# Patient Record
Sex: FEMALE | Race: WHITE
[De-identification: names, ages, dates, MRNs, and addresses within clinical notes are randomized per-mention and may not be internally consistent; named-entity substitution may affect disease eponyms.]

---

## 2018-10-30 PROBLEM — Z00.00 ENCOUNTER FOR PREVENTIVE HEALTH EXAMINATION: Status: ACTIVE | Noted: 2018-10-30

## 2018-11-08 ENCOUNTER — APPOINTMENT (OUTPATIENT)
Dept: HEMATOLOGY ONCOLOGY | Facility: CLINIC | Age: 58
End: 2018-11-08
Payer: COMMERCIAL

## 2018-11-08 DIAGNOSIS — N18.3 CHRONIC KIDNEY DISEASE, STAGE 3 (MODERATE): ICD-10-CM

## 2018-11-08 DIAGNOSIS — Z86.79 PERSONAL HISTORY OF OTHER DISEASES OF THE CIRCULATORY SYSTEM: ICD-10-CM

## 2018-11-08 DIAGNOSIS — Z87.891 PERSONAL HISTORY OF NICOTINE DEPENDENCE: ICD-10-CM

## 2018-11-08 DIAGNOSIS — Z80.1 FAMILY HISTORY OF MALIGNANT NEOPLASM OF TRACHEA, BRONCHUS AND LUNG: ICD-10-CM

## 2018-11-08 DIAGNOSIS — Z86.39 PERSONAL HISTORY OF OTHER ENDOCRINE, NUTRITIONAL AND METABOLIC DISEASE: ICD-10-CM

## 2018-11-08 PROCEDURE — 99205 OFFICE O/P NEW HI 60 MIN: CPT

## 2018-11-08 NOTE — ASSESSMENT
[FreeTextEntry1] : Given the very small size 0.5 cm with no mitoses seen, a low grade (G1), negative LVI, and negative margins she has likely had a curative surgery at this point.\par I would like to review the findings with pathology to get a better orientation of where the tumor was within the surgical specimen and to establish the extent of the margins.\par I will obtain a CBC and a CMP today's office visit.\par I will order abdominal and pelvic imaging.\par She will return in 2 weeks to review the above mentioned workup and to establish a followup care plan from that point moving forward. [Curative] : Goals of care discussed with patient: Curative

## 2018-11-08 NOTE — HISTORY OF PRESENT ILLNESS
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 Pathology from this revealed a 0.5 cm lesion, that was grade 1, with no mitoses being seen, no LVI, and surgical margins were clear. She is now been referred for further oncological evaluation and management recommendations. She offers no new complaints at the time of this office evaluation. She denies fevers, chills, lightheadedness, dizziness, headaches, chest pain, chest palpitations, shortness of breath, abdominal pain, signs of blood loss including melena, urinary symptoms, lower extremity swelling or pain, or rashes/ecchymoses/petechiae. 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

## 2018-11-20 ENCOUNTER — APPOINTMENT (OUTPATIENT)
Dept: HEMATOLOGY ONCOLOGY | Facility: CLINIC | Age: 58
End: 2018-11-20
Payer: COMMERCIAL

## 2018-11-20 VITALS
TEMPERATURE: 98.1 F | OXYGEN SATURATION: 95 % | BODY MASS INDEX: 32.56 KG/M2 | SYSTOLIC BLOOD PRESSURE: 148 MMHG | DIASTOLIC BLOOD PRESSURE: 86 MMHG | HEART RATE: 76 BPM | HEIGHT: 66.5 IN | WEIGHT: 205 LBS | RESPIRATION RATE: 18 BRPM

## 2018-11-20 PROCEDURE — 99214 OFFICE O/P EST MOD 30 MIN: CPT

## 2018-11-20 NOTE — HISTORY OF PRESENT ILLNESS
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 Pathology from this revealed a 0.5 cm lesion, that was grade 1, with no mitoses being seen, no LVI, and surgical margins were clear. She is now been referred for further oncological evaluation and management recommendations. She offers no new complaints at the time of this office evaluation. She denies fevers, chills, lightheadedness, dizziness, headaches, chest pain, chest palpitations, shortness of breath, abdominal pain, signs of blood loss including melena, urinary symptoms, lower extremity swelling or pain, or rashes/ecchymoses/petechiae. 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  She obtained a CT of the abdomen and pelvis. [0 - No Distress] : Distress Level: 0

## 2018-11-20 NOTE — ASSESSMENT
[FreeTextEntry1] : Given the very small size 0.5 cm with no mitoses seen, a low grade (G1), negative LVI, and negative margins she has likely had a curative surgery at this point.\par I will obtain a CBC and a CMP today's office visit.\par Abdominal and pelvic imaging showed KRISTY.\par She will return in 3 months for f/u with labs.  Given the favorable characteristics of her disease we agreed to re-image in 6 months.

## 2019-02-11 ENCOUNTER — APPOINTMENT (OUTPATIENT)
Dept: HEMATOLOGY ONCOLOGY | Facility: CLINIC | Age: 59
End: 2019-02-11
Payer: COMMERCIAL

## 2019-02-11 VITALS
BODY MASS INDEX: 30.34 KG/M2 | DIASTOLIC BLOOD PRESSURE: 94 MMHG | RESPIRATION RATE: 18 BRPM | HEIGHT: 66.5 IN | SYSTOLIC BLOOD PRESSURE: 152 MMHG | WEIGHT: 191 LBS | TEMPERATURE: 97.7 F | HEART RATE: 60 BPM | OXYGEN SATURATION: 97 %

## 2019-02-11 PROCEDURE — 99214 OFFICE O/P EST MOD 30 MIN: CPT

## 2019-02-11 NOTE — ASSESSMENT
[FreeTextEntry1] : Given the very small size 0.5 cm with no mitoses seen, a low grade (G1), negative LVI, and negative margins she has likely had a curative surgery at this point.\par I will obtain a CBC and a CMP today's office visit.\par Abdominal and pelvic imaging showed KRISTY in November of 2018.\par She will return in 3 months for f/u with labs.  Given the favorable characteristics of her disease we agreed to re-image in 6 months, which will be ordered at the next office visit.

## 2019-02-11 NOTE — HISTORY OF PRESENT ILLNESS
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 Pathology from this revealed a 0.5 cm lesion, that was grade 1, with no mitoses being seen, no LVI, and surgical margins were clear. She is now been referred for further oncological evaluation and management recommendations. She offers no new complaints at the time of this office evaluation. She denies fevers, chills, lightheadedness, dizziness, headaches, chest pain, chest palpitations, shortness of breath, abdominal pain, signs of blood loss including melena, urinary symptoms, lower extremity swelling or pain, or rashes/ecchymoses/petechiae. 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 She offers no complaints at this time.

## 2019-05-06 ENCOUNTER — APPOINTMENT (OUTPATIENT)
Dept: HEMATOLOGY ONCOLOGY | Facility: CLINIC | Age: 59
End: 2019-05-06
Payer: COMMERCIAL

## 2019-05-06 VITALS
SYSTOLIC BLOOD PRESSURE: 169 MMHG | HEIGHT: 66.5 IN | WEIGHT: 190 LBS | DIASTOLIC BLOOD PRESSURE: 84 MMHG | TEMPERATURE: 98.2 F | OXYGEN SATURATION: 98 % | HEART RATE: 58 BPM | BODY MASS INDEX: 30.17 KG/M2 | RESPIRATION RATE: 18 BRPM

## 2019-05-06 PROCEDURE — 99214 OFFICE O/P EST MOD 30 MIN: CPT

## 2019-05-06 NOTE — ASSESSMENT
[FreeTextEntry1] : Given the very small size 0.5 cm with no mitoses seen, a low grade (G1), negative LVI, and negative margins she has likely had a curative surgery at this point.\par I will obtain a CBC and a CMP today's office visit.\par Abdominal and pelvic imaging showed KRISTY in November of 2018.\par She will return in 3 weeks for f/u with labs.  Given the favorable characteristics of her disease we agreed to re-image 6 months apart, therefore, scans were ordered at this office visit.

## 2019-05-06 NOTE — HISTORY OF PRESENT ILLNESS
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 Pathology from this revealed a 0.5 cm lesion, that was grade 1, with no mitoses being seen, no LVI, and surgical margins were clear. She is now been referred for further oncological evaluation and management recommendations. She offers no new complaints at the time of this office evaluation. She denies fevers, chills, lightheadedness, dizziness, headaches, chest pain, chest palpitations, shortness of breath, abdominal pain, signs of blood loss including melena, urinary symptoms, lower extremity swelling or pain, or rashes/ecchymoses/petechiae. [de-identified] : Ms Shah is here today for a follow up visit. She offers no complaints at this time.  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

## 2019-05-28 ENCOUNTER — APPOINTMENT (OUTPATIENT)
Dept: HEMATOLOGY ONCOLOGY | Facility: CLINIC | Age: 59
End: 2019-05-28
Payer: COMMERCIAL

## 2019-05-28 VITALS
DIASTOLIC BLOOD PRESSURE: 96 MMHG | WEIGHT: 184 LBS | SYSTOLIC BLOOD PRESSURE: 175 MMHG | HEART RATE: 62 BPM | BODY MASS INDEX: 29.25 KG/M2 | TEMPERATURE: 97.8 F | RESPIRATION RATE: 18 BRPM | OXYGEN SATURATION: 97 %

## 2019-05-28 PROCEDURE — 99214 OFFICE O/P EST MOD 30 MIN: CPT

## 2019-05-28 NOTE — ASSESSMENT
[FreeTextEntry1] : Given the very small size 0.5 cm with no mitoses seen, a low grade (G1), negative LVI, and negative margins she has likely had a curative surgery at this point.\par I will obtain a CBC and a CMP today's office visit.\par Abdominal and pelvic imaging showed KRISTY in November of 2018.  Repeat imaging last week showed an enlarging cystic splenic lesion that was not reported on the prior scan.   This shows marginal growth since the last scan.  I will obtain an MRI of the abdomen to better assess this finding.  \par She will return in 1 week for f/u with scan review.

## 2019-05-28 NOTE — HISTORY OF PRESENT ILLNESS
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 Pathology from this revealed a 0.5 cm lesion, that was grade 1, with no mitoses being seen, no LVI, and surgical margins were clear. She is now been referred for further oncological evaluation and management recommendations. She offers no new complaints at the time of this office evaluation. She denies fevers, chills, lightheadedness, dizziness, headaches, chest pain, chest palpitations, shortness of breath, abdominal pain, signs of blood loss including melena, urinary symptoms, lower extremity swelling or pain, or rashes/ecchymoses/petechiae. 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 She offers no complaints at this time.

## 2019-05-31 ENCOUNTER — RX CHANGE (OUTPATIENT)
Age: 59
End: 2019-05-31

## 2019-06-10 ENCOUNTER — APPOINTMENT (OUTPATIENT)
Dept: HEMATOLOGY ONCOLOGY | Facility: CLINIC | Age: 59
End: 2019-06-10
Payer: COMMERCIAL

## 2019-06-10 VITALS
HEART RATE: 63 BPM | OXYGEN SATURATION: 97 % | BODY MASS INDEX: 28.9 KG/M2 | SYSTOLIC BLOOD PRESSURE: 164 MMHG | HEIGHT: 66.5 IN | TEMPERATURE: 97.8 F | RESPIRATION RATE: 18 BRPM | DIASTOLIC BLOOD PRESSURE: 90 MMHG | WEIGHT: 182 LBS

## 2019-06-10 PROCEDURE — 99214 OFFICE O/P EST MOD 30 MIN: CPT

## 2019-06-10 NOTE — HISTORY OF PRESENT ILLNESS
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 Pathology from this revealed a 0.5 cm lesion, that was grade 1, with no mitoses being seen, no LVI, and surgical margins were clear. She is now been referred for further oncological evaluation and management recommendations. She offers no new complaints at the time of this office evaluation. She denies fevers, chills, lightheadedness, dizziness, headaches, chest pain, chest palpitations, shortness of breath, abdominal pain, signs of blood loss including melena, urinary symptoms, lower extremity swelling or pain, or rashes/ecchymoses/petechiae. 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 She offers no complaints at this time.

## 2019-06-10 NOTE — ASSESSMENT
[FreeTextEntry1] : Given the very small size 0.5 cm with no mitoses seen, a low grade (G1), negative LVI, and negative margins she has likely had a curative surgery at this point.\par I will obtain a CBC and a CMP today's office visit.\par Abdominal and pelvic imaging showed KRISTY in November of 2018.  Repeat imaging showed an enlarging cystic splenic lesion that was not reported on the prior scan.   This shows marginal growth since the last scan.  \par I have obtained an MRI of the abdomen to better assess this and this appears to be a septated cystic structure without post contrast enhancement. 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  I have recommended repeat imaging in 3-4 months to assure stability.\par She will return in 3 months for f/u, labs, and rescans to be ordered then.

## 2019-09-13 ENCOUNTER — APPOINTMENT (OUTPATIENT)
Dept: HEMATOLOGY ONCOLOGY | Facility: CLINIC | Age: 59
End: 2019-09-13
Payer: COMMERCIAL

## 2019-09-13 VITALS
OXYGEN SATURATION: 100 % | BODY MASS INDEX: 29.6 KG/M2 | WEIGHT: 182 LBS | DIASTOLIC BLOOD PRESSURE: 76 MMHG | SYSTOLIC BLOOD PRESSURE: 140 MMHG | RESPIRATION RATE: 18 BRPM | HEART RATE: 46 BPM | TEMPERATURE: 97.8 F | HEIGHT: 65.75 IN

## 2019-09-13 PROCEDURE — 99214 OFFICE O/P EST MOD 30 MIN: CPT

## 2019-09-13 NOTE — ASSESSMENT
[FreeTextEntry1] : Given the very small size 0.5 cm with no mitoses seen, a low grade (G1), negative LVI, and negative margins she has likely had a curative surgery at this point.\par I will obtain a CBC and a CMP today's office visit.\par Abdominal and pelvic imaging showed KRISTY in November of 2018.  Repeat imaging showed an enlarging cystic splenic lesion that was not reported on the prior scan.   This shows marginal growth since the last scan.  \par I have obtained an MRI of the abdomen to better assess this and this appears to be a septated cystic structure without post contrast enhancement. 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  I have recommended repeat imaging in 3-4 months to assure stability.  This will be ordered today.\par She will return in 3 weeks for f/u, labs, and rescans review.

## 2019-09-13 NOTE — HISTORY OF PRESENT ILLNESS
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 Pathology from this revealed a 0.5 cm lesion, that was grade 1, with no mitoses being seen, no LVI, and surgical margins were clear. She is now been referred for further oncological evaluation and management recommendations. She offers no new complaints at the time of this office evaluation. She denies fevers, chills, lightheadedness, dizziness, headaches, chest pain, chest palpitations, shortness of breath, abdominal pain, signs of blood loss including melena, urinary symptoms, lower extremity swelling or pain, or rashes/ecchymoses/petechiae. 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 She offers no complaints at this time.

## 2019-09-23 ENCOUNTER — APPOINTMENT (OUTPATIENT)
Dept: HEMATOLOGY ONCOLOGY | Facility: CLINIC | Age: 59
End: 2019-09-23
Payer: COMMERCIAL

## 2019-09-23 VITALS
TEMPERATURE: 97.9 F | OXYGEN SATURATION: 97 % | WEIGHT: 182 LBS | SYSTOLIC BLOOD PRESSURE: 119 MMHG | RESPIRATION RATE: 18 BRPM | BODY MASS INDEX: 29.6 KG/M2 | HEIGHT: 65.75 IN | DIASTOLIC BLOOD PRESSURE: 73 MMHG | HEART RATE: 61 BPM

## 2019-09-23 PROCEDURE — 99214 OFFICE O/P EST MOD 30 MIN: CPT

## 2019-09-23 NOTE — ASSESSMENT
[FreeTextEntry1] : Given the very small size 0.5 cm with no mitoses seen, a low grade (G1), negative LVI, and negative margins she has likely had a curative surgery at this point.\par I will obtain a CBC and a CMP today's office visit.\par Abdominal and pelvic imaging showed KRISTY in November of 2018.  Repeat imaging showed an enlarging cystic splenic lesion that was not reported on the prior scan.   This shows marginal growth since the last scan.  \par I have obtained an MRI of the abdomen to better assess this and this appears to be a septated cystic structure without post contrast enhancement. 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  I have recommended repeat imaging in 3-4 months to assure stability.  This was performed on 9/20/19 which showed KRISTY with a decrease in the size of the splenic cyst from 2 cm to 3 cm.\par She will return in 3 months for f/u, and labs.

## 2019-12-17 ENCOUNTER — APPOINTMENT (OUTPATIENT)
Dept: HEMATOLOGY ONCOLOGY | Facility: CLINIC | Age: 59
End: 2019-12-17
Payer: COMMERCIAL

## 2019-12-17 VITALS
HEIGHT: 66 IN | HEART RATE: 68 BPM | RESPIRATION RATE: 18 BRPM | WEIGHT: 188 LBS | TEMPERATURE: 97.7 F | BODY MASS INDEX: 30.22 KG/M2 | DIASTOLIC BLOOD PRESSURE: 79 MMHG | SYSTOLIC BLOOD PRESSURE: 143 MMHG | OXYGEN SATURATION: 96 %

## 2019-12-17 PROCEDURE — 99214 OFFICE O/P EST MOD 30 MIN: CPT

## 2019-12-17 RX ORDER — MENTHOL/CAMPHOR 0.5 %-0.5%
1000 LOTION (ML) TOPICAL
Refills: 0 | Status: ACTIVE | COMMUNITY

## 2019-12-17 RX ORDER — ATORVASTATIN CALCIUM 40 MG/1
40 TABLET, FILM COATED ORAL
Refills: 0 | Status: ACTIVE | COMMUNITY

## 2019-12-17 RX ORDER — MULTIVITAMIN
TABLET ORAL
Refills: 0 | Status: ACTIVE | COMMUNITY

## 2019-12-17 NOTE — HISTORY OF PRESENT ILLNESS
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 Pathology from this revealed a 0.5 cm lesion, that was grade 1, with no mitoses being seen, no LVI, and surgical margins were clear. She is now been referred for further oncological evaluation and management recommendations. She offers no new complaints at the time of this office evaluation. She denies fevers, chills, lightheadedness, dizziness, headaches, chest pain, chest palpitations, shortness of breath, abdominal pain, signs of blood loss including melena, urinary symptoms, lower extremity swelling or pain, or rashes/ecchymoses/petechiae. 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 She offers no new complaints.

## 2019-12-17 NOTE — ASSESSMENT
[FreeTextEntry1] : Given the very small size 0.5 cm with no mitoses seen, a low grade (G1), negative LVI, and negative margins she has likely had a curative surgery at this point.\par I will obtain a CBC and a CMP today's office visit.\par Abdominal and pelvic imaging showed KRISTY in November of 2018.  Repeat imaging showed an enlarging cystic splenic lesion that was not reported on the prior scan.   This shows marginal growth since the last scan.  \par I have obtained an MRI of the abdomen to better assess this and this appears to be a septated cystic structure without post contrast enhancement. 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  I have recommended repeat imaging in 3-4 months to assure stability.  This was performed on 9/20/19 which showed KRISTY with a decrease in the size of the splenic cyst from 2 cm to 3 cm.\par She will return in 3 months for f/u, labs and rescans to be ordered then.

## 2020-03-17 ENCOUNTER — APPOINTMENT (OUTPATIENT)
Dept: HEMATOLOGY ONCOLOGY | Facility: CLINIC | Age: 60
End: 2020-03-17
Payer: COMMERCIAL

## 2020-03-17 ENCOUNTER — APPOINTMENT (OUTPATIENT)
Dept: HEMATOLOGY ONCOLOGY | Facility: CLINIC | Age: 60
End: 2020-03-17

## 2020-03-17 VITALS
HEART RATE: 55 BPM | DIASTOLIC BLOOD PRESSURE: 60 MMHG | HEIGHT: 66 IN | RESPIRATION RATE: 18 BRPM | OXYGEN SATURATION: 98 % | BODY MASS INDEX: 30.86 KG/M2 | SYSTOLIC BLOOD PRESSURE: 152 MMHG | TEMPERATURE: 97.6 F | WEIGHT: 192 LBS

## 2020-03-17 PROCEDURE — 99214 OFFICE O/P EST MOD 30 MIN: CPT

## 2020-03-17 NOTE — ASSESSMENT
[FreeTextEntry1] : Given the very small size 0.5 cm with no mitoses seen, a low grade (G1), negative LVI, and negative margins she has likely had a curative surgery at this point.\par I will obtain a CBC and a CMP today's office visit.\par Abdominal and pelvic imaging showed KRISTY in November of 2018.  Repeat imaging showed an enlarging cystic splenic lesion that was not reported on the prior scan.   This shows marginal growth since the last scan.  \par I have obtained an MRI of the abdomen to better assess this and this appears to be a septated cystic structure without post contrast enhancement. 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  I have recommended repeat imaging in 3-4 months to assure stability.  This was performed on 9/20/19 which showed KRISTY with a decrease in the size of the splenic cyst from 2 cm to 3 cm.\par She will return in 3 months for f/u, labs and rescans have bee ordered.  Given the current COVID situation she knows to call for her results shortly after completing the scans.

## 2020-03-17 NOTE — HISTORY OF PRESENT ILLNESS
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 Pathology from this revealed a 0.5 cm lesion, that was grade 1, with no mitoses being seen, no LVI, and surgical margins were clear. She is now been referred for further oncological evaluation and management recommendations. She offers no new complaints at the time of this office evaluation. She denies fevers, chills, lightheadedness, dizziness, headaches, chest pain, chest palpitations, shortness of breath, abdominal pain, signs of blood loss including melena, urinary symptoms, lower extremity swelling or pain, or rashes/ecchymoses/petechiae. 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 She offers no new complaints.

## 2020-04-26 ENCOUNTER — MESSAGE (OUTPATIENT)
Age: 60
End: 2020-04-26

## 2020-05-13 LAB
SARS-COV-2 IGG SERPL IA-ACNC: <0.1 INDEX
SARS-COV-2 IGG SERPL QL IA: NEGATIVE

## 2020-06-23 ENCOUNTER — APPOINTMENT (OUTPATIENT)
Dept: HEMATOLOGY ONCOLOGY | Facility: CLINIC | Age: 60
End: 2020-06-23
Payer: COMMERCIAL

## 2020-06-23 VITALS
OXYGEN SATURATION: 99 % | RESPIRATION RATE: 18 BRPM | TEMPERATURE: 97.5 F | SYSTOLIC BLOOD PRESSURE: 145 MMHG | WEIGHT: 189 LBS | HEART RATE: 60 BPM | DIASTOLIC BLOOD PRESSURE: 82 MMHG | BODY MASS INDEX: 30.51 KG/M2

## 2020-06-23 PROCEDURE — 99213 OFFICE O/P EST LOW 20 MIN: CPT

## 2020-11-09 ENCOUNTER — APPOINTMENT (OUTPATIENT)
Dept: HEMATOLOGY ONCOLOGY | Facility: CLINIC | Age: 60
End: 2020-11-09
Payer: COMMERCIAL

## 2020-11-09 VITALS
RESPIRATION RATE: 18 BRPM | DIASTOLIC BLOOD PRESSURE: 95 MMHG | HEART RATE: 61 BPM | HEIGHT: 66 IN | BODY MASS INDEX: 32.14 KG/M2 | TEMPERATURE: 98.1 F | SYSTOLIC BLOOD PRESSURE: 165 MMHG | OXYGEN SATURATION: 97 % | WEIGHT: 200 LBS

## 2020-11-09 PROCEDURE — 99072 ADDL SUPL MATRL&STAF TM PHE: CPT

## 2020-11-09 PROCEDURE — 99214 OFFICE O/P EST MOD 30 MIN: CPT

## 2020-11-09 NOTE — HISTORY OF PRESENT ILLNESS
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 Pathology from this revealed a 0.5 cm lesion, that was grade 1, with no mitoses being seen, no LVI, and surgical margins were clear. She is now been referred for further oncological evaluation and management recommendations. She offers no new complaints at the time of this office evaluation. She denies fevers, chills, lightheadedness, dizziness, headaches, chest pain, chest palpitations, shortness of breath, abdominal pain, signs of blood loss including melena, urinary symptoms, lower extremity swelling or pain, or rashes/ecchymoses/petechiae. 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 She offers no new complaints.

## 2020-11-09 NOTE — ASSESSMENT
[FreeTextEntry1] : Given the very small size 0.5 cm with no mitoses seen, a low grade (G1), negative LVI, and negative margins she has likely had a curative surgery at this point.\par I will obtain a CBC and a CMP today's office visit.\par Abdominal and pelvic imaging showed KRISTY in November of 2018.  Repeat imaging showed an enlarging cystic splenic lesion that was not reported on the prior scan.   This shows marginal growth since the last scan.  \par I have obtained an MRI of the abdomen to better assess this and this appears to be a septated cystic structure without post contrast enhancement. 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  I have recommended repeat imaging in 3-4 months to assure stability.  This was performed on 9/20/19 which showed KRISTY with a decrease in the size of the splenic cyst from 2 cm to 3 cm.  This was more recently performed on 11/5/20 that showed KRISTY and no splenic lesion.\par She will return in 3 months for f/u, labs.

## 2021-02-08 ENCOUNTER — APPOINTMENT (OUTPATIENT)
Dept: HEMATOLOGY ONCOLOGY | Facility: CLINIC | Age: 61
End: 2021-02-08
Payer: COMMERCIAL

## 2021-02-08 VITALS
RESPIRATION RATE: 18 BRPM | BODY MASS INDEX: 31.98 KG/M2 | HEIGHT: 66 IN | SYSTOLIC BLOOD PRESSURE: 170 MMHG | TEMPERATURE: 98.1 F | DIASTOLIC BLOOD PRESSURE: 100 MMHG | HEART RATE: 62 BPM | WEIGHT: 199 LBS | OXYGEN SATURATION: 98 %

## 2021-02-08 PROCEDURE — 99213 OFFICE O/P EST LOW 20 MIN: CPT

## 2021-02-08 PROCEDURE — 99072 ADDL SUPL MATRL&STAF TM PHE: CPT

## 2021-02-08 RX ORDER — OLMESARTAN MEDOXOMIL 5 MG/1
5 TABLET, FILM COATED ORAL
Qty: 90 | Refills: 0 | Status: ACTIVE | COMMUNITY
Start: 2020-12-14

## 2021-02-08 RX ORDER — ATORVASTATIN CALCIUM 10 MG/1
10 TABLET, FILM COATED ORAL
Qty: 90 | Refills: 0 | Status: ACTIVE | COMMUNITY
Start: 2020-11-09

## 2021-02-08 NOTE — HISTORY OF PRESENT ILLNESS
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 Pathology from this revealed a 0.5 cm lesion, that was grade 1, with no mitoses being seen, no LVI, and surgical margins were clear. She is now been referred for further oncological evaluation and management recommendations. She offers no new complaints at the time of this office evaluation. She denies fevers, chills, lightheadedness, dizziness, headaches, chest pain, chest palpitations, shortness of breath, abdominal pain, signs of blood loss including melena, urinary symptoms, lower extremity swelling or pain, or rashes/ecchymoses/petechiae. 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  She offers no new complaints. Her BP is elevated which she will discuss with her PCP who started on Olmersartan. She also had a recent colonoscopy/endoscopy which does not need to be repeated for 10 years according to GI.

## 2021-02-08 NOTE — ASSESSMENT
[FreeTextEntry1] : Given the very small size 0.5 cm with no mitoses seen, a low grade (G1), negative LVI, and negative margins she has likely had a curative surgery at this point.\par I will obtain a CBC and a CMP today's office visit.\par Abdominal and pelvic imaging showed KRISTY in November of 2018.  Repeat imaging showed an enlarging cystic splenic lesion that was not reported on the prior scan.   This shows marginal growth since the last scan.  \par I have obtained an MRI of the abdomen to better assess this and this appears to be a septated cystic structure without post contrast enhancement. 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  I have recommended repeat imaging in 3-4 months to assure stability.  This was performed on 9/20/19 which showed KRISTY with a decrease in the size of the splenic cyst from 2 cm to 3 cm.  This was more recently performed on 11/5/20 that showed KRISTY and no splenic lesion.\par Reviewed available labs which were wnl.  \par CT abd/pelvis + to be repeated in May.\par Continue routine, age-appropriate, healthcare maintenance \par History of present illness, review of systems, physical exam and treatment plan reviewed with .\par Office visit in 12 weeks or prn for new or worsening symptoms. \par

## 2021-02-08 NOTE — RESULTS/DATA
[FreeTextEntry1] : WBC 7.81\par Hemoglobin 14.2\par Hematocrit 43.5\par Platelet count 256,000\par Glucose 146

## 2021-08-10 ENCOUNTER — APPOINTMENT (OUTPATIENT)
Dept: HEMATOLOGY ONCOLOGY | Facility: CLINIC | Age: 61
End: 2021-08-10
Payer: COMMERCIAL

## 2021-08-10 VITALS
SYSTOLIC BLOOD PRESSURE: 180 MMHG | OXYGEN SATURATION: 97 % | DIASTOLIC BLOOD PRESSURE: 67 MMHG | TEMPERATURE: 98.2 F | WEIGHT: 203 LBS | RESPIRATION RATE: 18 BRPM | BODY MASS INDEX: 32.62 KG/M2 | HEIGHT: 66 IN | HEART RATE: 60 BPM

## 2021-08-10 PROCEDURE — 99214 OFFICE O/P EST MOD 30 MIN: CPT

## 2021-12-07 ENCOUNTER — APPOINTMENT (OUTPATIENT)
Dept: HEMATOLOGY ONCOLOGY | Facility: CLINIC | Age: 61
End: 2021-12-07
Payer: COMMERCIAL

## 2021-12-07 VITALS
WEIGHT: 189 LBS | HEIGHT: 66 IN | OXYGEN SATURATION: 98 % | BODY MASS INDEX: 30.37 KG/M2 | TEMPERATURE: 97.4 F | HEART RATE: 62 BPM | SYSTOLIC BLOOD PRESSURE: 169 MMHG | DIASTOLIC BLOOD PRESSURE: 95 MMHG | RESPIRATION RATE: 18 BRPM

## 2021-12-07 PROCEDURE — 99214 OFFICE O/P EST MOD 30 MIN: CPT

## 2021-12-07 NOTE — ASSESSMENT
[FreeTextEntry1] : Given the very small size 0.5 cm with no mitoses seen, a low grade (G1), negative LVI, and negative margins she has likely had a curative surgery at this point.\par I will obtain a CBC and a CMP today's office visit.\par Abdominal and pelvic imaging showed KRISTY in November of 2018.  Repeat imaging showed an enlarging cystic splenic lesion that was not reported on the prior scan.   This shows marginal growth since the last scan.  \par I have obtained an MRI of the abdomen to better assess this and this appears to be a septated cystic structure without post contrast enhancement. 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  I have recommended repeat imaging in 3-4 months to assure stability.  This was performed on 9/20/19 which showed KRISTY with a decrease in the size of the splenic cyst from 2 cm to 3 cm.  This was more recently performed on 11/5/20 that showed KRISTY and no splenic lesion.\par Reviewed available labs which were wnl.  \par CT abd/pelvis has been ordered at this visit.\par Continue routine, age-appropriate, healthcare maintenance \par Office visit in 16 weeks or prn for new or worsening symptoms. \par

## 2021-12-07 NOTE — HISTORY OF PRESENT ILLNESS
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 Pathology from this revealed a 0.5 cm lesion, that was grade 1, with no mitoses being seen, no LVI, and surgical margins were clear. She is now been referred for further oncological evaluation and management recommendations. She offers no new complaints at the time of this office evaluation. She denies fevers, chills, lightheadedness, dizziness, headaches, chest pain, chest palpitations, shortness of breath, abdominal pain, signs of blood loss including melena, urinary symptoms, lower extremity swelling or pain, or rashes/ecchymoses/petechiae. 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  She offers no new complaints. Her BP is elevated which she will discuss with her PCP who started on Olmersartan. She also had a recent colonoscopy/endoscopy which does not need to be repeated for 10 years according to GI.   She recently underwent a right total knee replacement and has had a very significant improvement in her quality of life.  She now exercises significantly more and has been watching her diet and lost a significant amount of weight intentionally since last visit.

## 2021-12-07 NOTE — REVIEW OF SYSTEMS
[FreeTextEntry9] : s/p right TKR with significant improvement in joint pain. [FreeTextEntry1] : strawberry allergy- hives

## 2021-12-10 ENCOUNTER — NON-APPOINTMENT (OUTPATIENT)
Age: 61
End: 2021-12-10

## 2022-05-03 ENCOUNTER — APPOINTMENT (OUTPATIENT)
Dept: HEMATOLOGY ONCOLOGY | Facility: CLINIC | Age: 62
End: 2022-05-03
Payer: COMMERCIAL

## 2022-05-03 VITALS
HEIGHT: 66 IN | WEIGHT: 172 LBS | RESPIRATION RATE: 18 BRPM | HEART RATE: 58 BPM | OXYGEN SATURATION: 98 % | DIASTOLIC BLOOD PRESSURE: 79 MMHG | BODY MASS INDEX: 27.64 KG/M2 | TEMPERATURE: 98.8 F | SYSTOLIC BLOOD PRESSURE: 167 MMHG

## 2022-05-03 PROCEDURE — 99214 OFFICE O/P EST MOD 30 MIN: CPT

## 2022-05-03 NOTE — HISTORY OF PRESENT ILLNESS
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 Pathology from this revealed a 0.5 cm lesion, that was grade 1, with no mitoses being seen, no LVI, and surgical margins were clear. She is now been referred for further oncological evaluation and management recommendations. She offers no new complaints at the time of this office evaluation. She denies fevers, chills, lightheadedness, dizziness, headaches, chest pain, chest palpitations, shortness of breath, abdominal pain, signs of blood loss including melena, urinary symptoms, lower extremity swelling or pain, or rashes/ecchymoses/petechiae. 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  She offers no new complaints. Her PCP started on Olmersartan. She also had a recent colonoscopy/endoscopy which does not need to be repeated for 10 years according to GI.   She recently underwent a right total knee replacement and has had a very significant improvement in her quality of life.  She now exercises significantly more and has been watching her diet and lost a significant amount of weight intentionally since last visit.

## 2022-05-03 NOTE — ASSESSMENT
[FreeTextEntry1] : Given the very small size 0.5 cm with no mitoses seen, a low grade (G1), negative LVI, and negative margins she has likely had a curative surgery at this point.\par I will obtain a CBC and a CMP today's office visit.\par Abdominal and pelvic imaging showed KRISTY in November of 2018.  Repeat imaging showed an enlarging cystic splenic lesion that was not reported on the prior scan.   This shows marginal growth since the last scan.  \par I have obtained an MRI of the abdomen to better assess this and this appears to be a septated cystic structure without post contrast enhancement. 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  I have recommended repeat imaging in 3-4 months to assure stability.  This was performed on 9/20/19 which showed KRISTY with a decrease in the size of the splenic cyst from 3 cm to 2 cm.  This was more recently performed on 11/5/20 that showed KRISTY and no splenic lesion.\par Reviewed available labs which were wnl.  \par CT abd/pelvis performed on 2/11/2022 continues to show KRISTY and no splenic abnormalities.\par Continue routine, age-appropriate, healthcare maintenance \par Office visit in 12 weeks or prn for new or worsening symptoms. \par

## 2022-08-09 ENCOUNTER — RESULT REVIEW (OUTPATIENT)
Age: 62
End: 2022-08-09

## 2022-08-09 ENCOUNTER — APPOINTMENT (OUTPATIENT)
Dept: HEMATOLOGY ONCOLOGY | Facility: CLINIC | Age: 62
End: 2022-08-09

## 2022-08-09 VITALS
TEMPERATURE: 98.8 F | SYSTOLIC BLOOD PRESSURE: 131 MMHG | DIASTOLIC BLOOD PRESSURE: 77 MMHG | RESPIRATION RATE: 18 BRPM | HEIGHT: 66 IN | BODY MASS INDEX: 27.32 KG/M2 | OXYGEN SATURATION: 98 % | WEIGHT: 170 LBS | HEART RATE: 60 BPM

## 2022-08-09 DIAGNOSIS — D73.89 OTHER DISEASES OF SPLEEN: ICD-10-CM

## 2022-08-09 DIAGNOSIS — C49.A2 GASTROINTESTINAL STROMAL TUMOR OF STOMACH: ICD-10-CM

## 2022-08-09 PROCEDURE — 99214 OFFICE O/P EST MOD 30 MIN: CPT

## 2022-08-12 NOTE — REVIEW OF SYSTEMS
[Negative] : Allergic/Immunologic [FreeTextEntry9] : s/p right TKR with significant improvement in joint pain. [FreeTextEntry1] : strawberry allergy- hives

## 2022-08-12 NOTE — ASSESSMENT
[FreeTextEntry1] : Given the very small size 0.5 cm with no mitoses seen, a low grade (G1), negative LVI, and negative margins she has likely had a curative surgery at this point.\par I will obtain a CBC and a CMP today's office visit.\par Abdominal and pelvic imaging showed KRISTY in November of 2018.  Repeat imaging showed an enlarging cystic splenic lesion that was not reported on the prior scan.   This shows marginal growth since the last scan.  \par I have obtained an MRI of the abdomen to better assess this and this appears to be a septated cystic structure without post contrast enhancement. 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  I have recommended repeat imaging in 3-4 months to assure stability.  This was performed on 9/20/19 which showed KRISTY with a decrease in the size of the splenic cyst from 3 cm to 2 cm.  This was more recently performed on 11/5/20 that showed KRISTY and no splenic lesion.\par Reviewed available labs which were wnl.  \par CT abd/pelvis performed on 2/11/2022 continues to show KRISTY and no splenic abnormalities.\par Repeat imaging will be ordered at the time of this office evaluation.  She knows to call the office within 1 week of completing the scans to review her results.\par Continue routine, age-appropriate, healthcare maintenance \par Office visit in 12 weeks or prn for new or worsening symptoms. \par

## 2022-08-12 NOTE — HISTORY OF PRESENT ILLNESS
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 Pathology from this revealed a 0.5 cm lesion, that was grade 1, with no mitoses being seen, no LVI, and surgical margins were clear. She is now been referred for further oncological evaluation and management recommendations. She offers no new complaints at the time of this office evaluation. She denies fevers, chills, lightheadedness, dizziness, headaches, chest pain, chest palpitations, shortness of breath, abdominal pain, signs of blood loss including melena, urinary symptoms, lower extremity swelling or pain, or rashes/ecchymoses/petechiae. 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  She offers no new complaints. Her PCP started on Olmersartan. She also had a recent colonoscopy/endoscopy which does not need to be repeated for 10 years according to GI.   She recently underwent a right total knee replacement and has had a very significant improvement in her quality of life.  She now exercises significantly more and has been watching her diet and lost a significant amount of weight intentionally since last visit.

## 2023-02-28 NOTE — REVIEW OF SYSTEMS
Caller returning call to clinical team.     Connected to [facility]- [place]- CONNECT CALL TO CALL CENTER [facility] QUEUE- ROUTE MESSAGE TO CALL CENTER [facility] POOL ([phone])       Hemal from [facility] is calling to speak with clinical.  Time [facility] notes will need to be filled in order for patient to move in Tomorrow 3.1.2023. Per Hemal this is a state requirement.  Transferring to clinical for further details.   [Negative] : Heme/Lymph [FreeTextEntry1] : strawberry allergy- hives

## 2024-02-18 ENCOUNTER — NON-APPOINTMENT (OUTPATIENT)
Age: 64
End: 2024-02-18

## 2025-08-04 ENCOUNTER — NON-APPOINTMENT (OUTPATIENT)
Age: 65
End: 2025-08-04